# Patient Record
Sex: MALE | Race: WHITE
[De-identification: names, ages, dates, MRNs, and addresses within clinical notes are randomized per-mention and may not be internally consistent; named-entity substitution may affect disease eponyms.]

---

## 2017-02-26 ENCOUNTER — HOSPITAL ENCOUNTER (EMERGENCY)
Dept: HOSPITAL 33 - ED | Age: 67
Discharge: HOME | End: 2017-02-26
Payer: MEDICARE

## 2017-02-26 VITALS — DIASTOLIC BLOOD PRESSURE: 83 MMHG | SYSTOLIC BLOOD PRESSURE: 139 MMHG | HEART RATE: 95 BPM

## 2017-02-26 VITALS — OXYGEN SATURATION: 94 %

## 2017-02-26 DIAGNOSIS — R20.0: ICD-10-CM

## 2017-02-26 DIAGNOSIS — R07.89: ICD-10-CM

## 2017-02-26 DIAGNOSIS — G62.9: Primary | ICD-10-CM

## 2017-02-26 LAB
ALBUMIN SERPL-MCNC: 4 G/DL (ref 3.4–5)
ALP SERPL-CCNC: 87 U/L (ref 46–116)
ALT SERPL-CCNC: 41 U/L (ref 12–78)
ANION GAP SERPL CALC-SCNC: 15.4 MEQ/L (ref 5–15)
AST SERPL QL: 25 U/L (ref 15–37)
BASOPHILS NFR BLD AUTO: 0.3 % (ref 0–0.4)
BILIRUB BLD-MCNC: 0.4 MG/DL (ref 0.2–1)
BUN SERPL-MCNC: 17 MG/DL (ref 9–20)
CHLORIDE SERPL-SCNC: 105 MEQ/L (ref 98–107)
CO2 SERPL-SCNC: 24.3 MEQ/L (ref 21–32)
GLUCOSE SERPL-MCNC: 98 MG/DL (ref 70–110)
MCH RBC QN AUTO: 28.3 PG (ref 26–32)
NEUTROPHILS NFR BLD AUTO: 56.5 % (ref 36–66)
PLATELET # BLD AUTO: 264 K/MM3 (ref 150–450)
POTASSIUM SERPLBLD-SCNC: 4 MEQ/L (ref 3.5–5.1)
PROT SERPL-MCNC: 8.2 GM/DL (ref 6.4–8.2)
RBC # BLD AUTO: 5.78 M/MM3 (ref 4.1–5.6)
SODIUM SERPL-SCNC: 141 MEQ/L (ref 136–145)
TROPONIN T SERPL HS-MCNC: < 0.017 NG/ML (ref 0–0.06)
WBC # BLD AUTO: 7.2 K/MM3 (ref 4–10.5)

## 2017-02-26 PROCEDURE — 99284 EMERGENCY DEPT VISIT MOD MDM: CPT

## 2017-02-26 PROCEDURE — 80053 COMPREHEN METABOLIC PANEL: CPT

## 2017-02-26 PROCEDURE — 72050 X-RAY EXAM NECK SPINE 4/5VWS: CPT

## 2017-02-26 PROCEDURE — 93041 RHYTHM ECG TRACING: CPT

## 2017-02-26 PROCEDURE — 93005 ELECTROCARDIOGRAM TRACING: CPT

## 2017-02-26 PROCEDURE — 84484 ASSAY OF TROPONIN QUANT: CPT

## 2017-02-26 PROCEDURE — 85025 COMPLETE CBC W/AUTO DIFF WBC: CPT

## 2017-02-26 PROCEDURE — 99283 EMERGENCY DEPT VISIT LOW MDM: CPT

## 2017-02-26 PROCEDURE — 71020: CPT

## 2017-02-26 PROCEDURE — 36000 PLACE NEEDLE IN VEIN: CPT

## 2017-02-26 PROCEDURE — 36415 COLL VENOUS BLD VENIPUNCTURE: CPT

## 2017-02-26 NOTE — XRAY
Indication: Left arm tingling.



Comparison: None



5 views of the cervical spine demonstrates normal alignment with C4-C6 limbus

vertebrae.  Disc spaces maintained.  C6-T1 degenerative facet arthropathy.  No

acute fracture, subluxation, or soft tissue abnormalities.  Foramina

bilaterally patent.



Impression: Degenerative changes in a otherwise negative cervical spine.

## 2017-02-26 NOTE — XRAY
Indication: Chest pain and left arm tingling.



Comparison: July 20, 2014.



PA/lateral chest again hyperinflated with right mid-upper lung peripheral

calcified granuloma.  No focal infiltrate, consolidation, or large effusion.

Heart and mediastinal structures stable and within normal limits.  Bony thorax

intact.



Impression: Stable nonacute chest with chronic features.

## 2017-02-26 NOTE — ERPHSYRPT
- History of Present Illness


Time Seen by Provider: 02/26/17 13:47


Source: patient


Exam Limitations: no limitations


Patient Subjective Stated Complaint: pt states he began having numbness to left 

arm on 2/23/17. states he started on lisinopril 10mg on 2/24/17 from 's 

office. pt denies any pain at this time.


Triage Nursing Assessment: pt pink, warm, dry. hand  equal and strong. 

radial pulses strong. pt ambulated into er without difficulty.


Physician History: 





The patient is a 66-year-old male with his wife complaining of left upper arm 

tingling for several weeks.  3 days ago the patient went for brisk walk with 

his wife and was concerned because he was out of breath and has chest 

discomfort during the walk.  The chest discomfort is no longer present that the 

patient talked to his son who convinced him to come to the emergency room for a 

workup.  His left upper arm is an intermittent pain that sometimes is 

positional with his arm.  His past medical history is significant for high 

blood pressure, anxiety, and GERD.  He's had numerous musculoskeletal 

surgeries.  He has no past medical history of heart problems.  He did smoke for 

many years.


Timing/Duration: week(s) (several), intermittent


Severity: mild


Character of Deficits: LLE (tingling)


Deficits: no difficulties


Baseline/Normal Cognition: alert oriented x 3


Current Cognition: alert oriented x 3


Baseline Gait: walks w/o assistance


Associated Symptoms: denies symptoms


Allergies/Adverse Reactions: 








iodine Allergy (Mild, Verified 02/26/17 13:31)


 Rash


 itchiness 





Home Medications: 








Lisinopril 10 mg*** [Zestril 10 MG***] 10 mg PO DAILY 02/26/17 [History]


Pantoprazole Sodium [Protonix] 40 mg PO DAILY 02/26/17 [History]





Hx Tetanus, Diphtheria Vaccination/Date Given: Yes (unknown)


Hx Influenza Vaccination/Date Given: Yes


Hx Pneumococcal Vaccination/Date Given: No


Immunizations Up to Date: Yes





- Review of Systems


Constitutional: No Fever, No Chills


Eyes: No Symptoms


Ears, Nose, & Throat: No Symptoms


Respiratory: No Cough, No Dyspnea


Cardiac: No Chest Pain, No Edema, No Syncope


Abdominal/Gastrointestinal: No Abdominal Pain, No Nausea, No Vomiting, No 

Diarrhea


Genitourinary Symptoms: No Dysuria


Musculoskeletal: No Back Pain, No Neck Pain


Skin: No Rash


Neurological: Other (tingling)


Psychological: No Symptoms


Endocrine: No Symptoms


Hematologic/Lymphatic: No Symptoms


Immunological/Allergic: No Symptoms


All Other Systems: Reviewed and Negative





- Past Medical History


Pertinent Past Medical History: Yes


Neurological History: No Pertinent History


ENT History: Cataracts


Cardiac History: Hypertension


Respiratory History: No Pertinent History


Endocrine Medical History: No Pertinent History


Musculoskeletal History: No Pertinent History


GI Medical History: GERD


 History: Other


Psycho-Social History: Anxiety


Other Medical History: viral encephalitis 1993





- Past Surgical History


Past Surgical History: Yes


Neuro Surgical History: No Pertinent History


Cardiac: No Pertinent History


Respiratory: No Pertinent History


Musculoskeletal: Joint Replacement, Orthopedic Surgery


Other Surgical History: donn knee replacement -cataract removed and lens 

replacement - kidney stones, carpal tunnel donn.





- Social History


Smoking Status: Former smoker


Exposure to second hand smoke: No


Drug Use: none


Patient Lives Alone: No





- Nursing Vital Signs


Nursing Vital Signs: 


 Initial Vital Signs











Temperature                    97.9 F


 


Temperature Source             Oral


 


Pulse Rate                     69


 


Respiratory Rate               18


 


Blood Pressure []              182/92


 


Pain Intensity                 0

















- [place] Coma Scale


Best Eye Response ([place]): (4) open spontaneously


Best Verbal Response ([place]): (5) oriented


Best Motor Response ([place]): (6) obeys commands


[place] Total: 15





- Physical Exam


General Appearance: no apparent distress, alert


Eye Exam: bilateral eye: normal inspection


Ears, Nose, Throat Exam: normal ENT inspection, moist mucous membranes


Neck Exam: normal inspection, non-tender, supple


Respiratory: normal breath sounds, lungs clear, airway intact, No respiratory 

distress


Cardiovascular: regular rate/rhythm, No edema


Gastrointestinal: soft, No tenderness, No distention


Rectal Exam: not done


Back Exam: normal inspection


Extremity Exam: normal inspection, No pedal edema


Mental Status: alert, oriented x 3


CNs Exam: tongue midline


Coordination/Gait: normal finger to nose, normal gait


Motor/Sensory: no motor deficit


Skin Exam: normal color, warm, dry, No rash


**SpO2 Interpretation**: normal


Oxygen Delivery: Room Air





- Course


EKG Interpreted by Me: Sinus Rhythm, NORMAL AXIS, NORMAL INTERVALS, NORMAL QRS, 

NORMAL ST-T





- Radiology Exams


  ** C-Spine


X-ray Interpretation: Interpreted by me, No Fracture, Other (degenerative 

changes, worse at C6/C7. Right foraminal poorly seen.)





  ** Chest


X-ray Interpretation: Interpreted by me, Negative


Ordered Tests: 


 Active Orders 24 hr











 Category Date Time Status


 


 Cardiac Monitor STAT Care  02/26/17 13:33 Active


 


 EKG-ER Only STAT Care  02/26/17 13:32 Active


 


 CERVICAL SPINE MINIMUM 4 VIEWS Stat Exams  02/26/17 14:10 Taken


 


 CHEST 2 VIEWS (PA AND LAT) Stat Exams  02/26/17 14:09 Taken


 


 CBC W DIFF Stat Lab  02/26/17 14:00 Completed


 


 CMP Stat Lab  02/26/17 14:00 Completed


 


 TROPONIN Stat Lab  02/26/17 14:00 Completed











Lab/Rad Data: 


 Laboratory Result Diagrams





 02/26/17 14:00 





 02/26/17 14:00 





 Laboratory Results











  02/26/17 02/26/17 Range/Units





  14:00 14:00 


 


WBC   7.2  (4.0-10.5)  K/mm3


 


RBC   5.78 H  (4.1-5.6)  M/mm3


 


Hgb   16.4  (12.5-18.0)  gm/dl


 


Hct   48.1  (42-50)  %


 


MCV   83.2  ()  fl


 


MCH   28.3  (26-32)  pg


 


MCHC   34.1  (32-36)  g/dl


 


RDW   13.6  (11.5-14.0)  %


 


Plt Count   264  (150-450)  K/mm3


 


MPV   10.4 H  (6-9.5)  fl


 


Gran %   56.5  (36.0-66.0)  %


 


Lymphocytes %   34.9  (24.0-44.0)  %


 


Monocytes %   7.3  (0.0-12.0)  %


 


Eosinophils %   1.0  (0.00-5.0)  %


 


Basophils %   0.3  (0.0-0.4)  %


 


Basophils #   0.02  (0-0.4)  


 


Sodium  141   (136-145)  mEq/L


 


Potassium  4.0   (3.5-5.1)  mEq/L


 


Chloride  105   ()  mEq/L


 


Carbon Dioxide  24.3   (21-32)  mEq/L


 


Anion Gap  15.4 H   (5-15)  MEQ/L


 


BUN  17   (9-20)  mg/dL


 


Creatinine  1.24   (0.55-1.30)  mg/dl


 


Estimated GFR  > 60   ML/MIN


 


Glucose  98   ()  MG/DL


 


Calcium  9.1   (8.5-10.1)  mg/dL


 


Total Bilirubin  0.4   (0.2-1.0)  mg/dL


 


AST  25   (15-37)  U/L


 


ALT  41   (12-78)  U/L


 


Alkaline Phosphatase  87   ()  U/L


 


Troponin I  < 0.017   (0.000-0.056)  ng/ml


 


Serum Total Protein  8.2   (6.4-8.2)  gm/dL


 


Albumin  4.0   (3.4-5.0)  g/dL














- Progress


Progress: unchanged


Counseled pt/family regarding: lab results, diagnosis, need for follow-up, rad 

results





- Departure


Time of Disposition: 14:57


Departure Disposition: Home


Clinical Impression: 


 Neuropathy of left upper extremity





Condition: Stable


Critical Care Time: No


Additional Instructions: 


Tylenol and Ibuprofen as needed. Follow up on Monday.

## 2018-01-02 ENCOUNTER — HOSPITAL ENCOUNTER (EMERGENCY)
Dept: HOSPITAL 33 - ED | Age: 68
Discharge: HOME | End: 2018-01-02
Payer: MEDICARE

## 2018-01-02 VITALS — HEART RATE: 75 BPM | SYSTOLIC BLOOD PRESSURE: 149 MMHG | DIASTOLIC BLOOD PRESSURE: 86 MMHG | OXYGEN SATURATION: 97 %

## 2018-01-02 DIAGNOSIS — M54.6: ICD-10-CM

## 2018-01-02 DIAGNOSIS — S29.011A: ICD-10-CM

## 2018-01-02 DIAGNOSIS — F41.9: ICD-10-CM

## 2018-01-02 DIAGNOSIS — R07.81: Primary | ICD-10-CM

## 2018-01-02 DIAGNOSIS — E11.9: ICD-10-CM

## 2018-01-02 DIAGNOSIS — I10: ICD-10-CM

## 2018-01-02 DIAGNOSIS — K21.9: ICD-10-CM

## 2018-01-02 PROCEDURE — 96372 THER/PROPH/DIAG INJ SC/IM: CPT

## 2018-01-02 PROCEDURE — 71100 X-RAY EXAM RIBS UNI 2 VIEWS: CPT

## 2018-01-02 PROCEDURE — 71045 X-RAY EXAM CHEST 1 VIEW: CPT

## 2018-01-02 PROCEDURE — 99284 EMERGENCY DEPT VISIT MOD MDM: CPT

## 2018-01-02 NOTE — XRAY
Indication: Right lower rib pain following injury.



Comparison: None



2 views of the right ribs obtained.  No bony, articular, or soft tissue

abnormalities.

## 2018-01-02 NOTE — XRAY
Indication: Right lower rib pain following injury.



Comparison: February 26, 2017.



Portable chest again hyperinflated and clear with incidental right upper lung

calcified granuloma.  Heart is not enlarged.  Bony thorax intact.



Impression: Stable nonacute chest with chronic feature.

## 2018-01-02 NOTE — ERPHSYRPT
- History of Present Illness


Time Seen by Provider: 01/02/18 14:52


Source: patient


Exam Limitations: no limitations


Patient Subjective Stated Complaint: was laying on floor board of car doing 

some work and felt something pop around posterior lower ribs.  now having pain 

with movement in ribs.


Triage Nursing Assessment: ambulated to room holding right flank area.  skin w/d

, color normal, resp easy.  patient yelling out and holding right side when he 

moves.  area very tender to touch.  no bruising noted.


Physician History: 





67-year-old white male arrives with complaint of pain in his right posterior 

low ribs since just prior to arrival.


According to the patient he was working on a car on his side he felt something 

pop now he has pain in the right lower posterior ribs worse with moving and 

palpation.


Patient has no shortness of breath.





Past medical history includes cataracts, high blood pressure, diabetes, GERD, 

anxiety, viral encephalitis.





Past surgical history includes joint replacement, orthopedic surgery, bilateral 

knee replacement, cataracts removed, lens replacement, kidney stones, bilateral 

carpal tunnel.





Timing/Duration: today (just prior to arrival)


Severity: moderate


Modifying Factors: Improves With: movement


Associated Symptoms: No nausea, No abdominal pain, No shortness of breath, No 

heartburn, No diaphoresis, No cough, No chills, No chest pain, No loss of 

appetite, No malaise, No rash, No syncope, No seizure, No weakness


Allergies/Adverse Reactions: 








iodine Allergy (Mild, Verified 01/02/18 14:42)


 Rash


 itchiness 





Home Medications: 








Sertraline HCl 50 mg** [Zoloft 50 mg Tablet**] 50 mg PO DAILY 01/02/18 [History]


Tamsulosin HCl 0.4 mg*** [Flomax 0.4 MG***] 0.4 mg PO DAILY 01/02/18 [History]





Hx Tetanus, Diphtheria Vaccination/Date Given: No (unknown)


Hx Influenza Vaccination/Date Given: Yes


Hx Pneumococcal Vaccination/Date Given: Yes





- Review of Systems


Constitutional: No Fever, No Chills


Eyes: No Symptoms


Ears, Nose, & Throat: No Symptoms


Respiratory: No Cough, No Dyspnea


Cardiac: No Chest Pain, No Edema, No Syncope


Abdominal/Gastrointestinal: No Abdominal Pain, No Nausea, No Vomiting, No 

Diarrhea


Genitourinary Symptoms: No Dysuria


Musculoskeletal: Other (pain right posterior lower ribs with movement and 

palpation)


Skin: No Rash


Neurological: No Dizziness, No Focal Weakness, No Sensory Changes


Psychological: No Symptoms


Endocrine: No Symptoms


All Other Systems: Reviewed and Negative





- Past Medical History


Pertinent Past Medical History: Yes


Neurological History: No Pertinent History


ENT History: Cataracts


Cardiac History: Hypertension


Respiratory History: No Pertinent History


Endocrine Medical History: No Pertinent History


Musculoskeletal History: No Pertinent History


GI Medical History: GERD


 History: Other


Psycho-Social History: Anxiety


Other Medical History: viral encephalitis 1993





- Past Surgical History


Past Surgical History: Yes


Neuro Surgical History: No Pertinent History


Cardiac: No Pertinent History


Respiratory: No Pertinent History


Musculoskeletal: Joint Replacement, Orthopedic Surgery


Other Surgical History: donn knee replacement -cataract removed and lens 

replacement - kidney stones, carpal tunnel donn.





- Social History


Smoking Status: Former smoker


Exposure to second hand smoke: No


Drug Use: none


Patient Lives Alone: No





- Nursing Vital Signs


Nursing Vital Signs: 


 Initial Vital Signs











Temperature  97.2 F   01/02/18 14:38


 


Pulse Rate  75   01/02/18 14:38


 


Respiratory Rate  19   01/02/18 14:38


 


Blood Pressure  149/86   01/02/18 14:38


 


O2 Sat by Pulse Oximetry  97   01/02/18 14:38








 Pain Scale











Pain Intensity                 10

















- Physical Exam


General Appearance: moderate distress


Eye Exam: PERRL/EOMI, eyes nml inspection


Ears, Nose, Throat Exam: normal ENT inspection, TMs normal, pharynx normal, 

moist mucous membranes


Neck Exam: normal inspection, non-tender, supple, full range of motion


Respiratory Exam: normal breath sounds, lungs clear, No respiratory distress


Cardiovascular Exam: regular rate/rhythm, normal heart sounds, normal 

peripheral pulses


Gastrointestinal/Abdomen Exam: soft, normal bowel sounds, No tenderness, No mass


Back Exam: other (pain right posterior lower ribs with palpation and movement)


Extremity Exam: normal inspection, normal range of motion, pelvis stable


Neurologic Exam: alert, oriented x 3, cooperative, normal mood/affect, nml 

cerebellar function, nml station & gait, sensation nml, No motor deficits


Skin Exam: normal color, warm, dry, No rash


Lymphatic Exam: No adenopathy


**SpO2 Interpretation**: normal (97%)


SpO2: 97


Oxygen Delivery: Room Air





- Course


Nursing assessment & vital signs reviewed: Yes





- Radiology Exams


  ** Chest


X-ray Interpretation: Discussed w/ radiologist (stable, non-acute chest with 

chronic features)





  ** Right Ribs


X-ray Interpretation: Discussed w/ radiologist (X-ray right ribs no bony, 

articular, or soft tissue abnormalities)


Ordered Tests: 


 Active Orders 24 hr











 Category Date Time Status


 


 CHEST 1 VIEW (PORTABLE) Stat Exams  01/02/18 14:51 Completed


 


 RIBS UNILATERAL Stat Exams  01/02/18 15:10 Completed








Medication Summary














Discontinued Medications














Generic Name Dose Route Start Last Admin





  Trade Name Maddi  PRN Reason Stop Dose Admin


 


Ketorolac Tromethamine  60 mg  01/02/18 14:49  01/02/18 15:00





  Toradol 30 Mg Injection***  IM  01/02/18 14:50  60 mg





  STAT ONE   Administration


 


Ketorolac Tromethamine  Confirm  01/02/18 14:55  





  Toradol 30 Mg Injection***  Administered  01/02/18 14:56  





  Dose   





  60 mg   





  .ROUTE   





  .STK-MED ONE   














- Progress


Progress: improved


Progress Note: 





01/02/18 15:35


67-year-old white male arrives with complaint of pain overlying the right 

posterior lower ribs which began when he was laying on his side working on a 

car he states he thought he felt a pop.


He is point tender over the right lower ribs lateral to the vertebral column 

with palpation and movement.





X-ray of the right ribs no fractures, no bony articular or soft tissue 

abnormalities.





X-ray of the chest stable nonacute chest with chronic feature bony thorax is 

intact.








Patient is better but not pain-free after Toradol 60 mg IM.





I have offered patient CT he does not want this at this point patient appears 

to be stable and is definitely point tender over the lower ribs.


I offered patient Geraldine he states he has this at home.





I offered to write a prescription for nonsteroidals patient states he will get 

these at home.





Will discharge patient.








- Departure


Time of Disposition: 15:37


Departure Disposition: Home


Clinical Impression: 


 Rib pain on right side, Musculoskeletal strain





Back pain


Qualifiers:


 Back pain location: thoracic back pain Chronicity: acute Back pain laterality: 

right Qualified Code(s): M54.6 - Pain in thoracic spine





Condition: Fair


Critical Care Time: No


Additional Instructions: 


Return home.


Cold packs to area 24-48 hours.


Norco as prescribed by your family doctor.


Advil 2-3 tablets orally every 6 hours with food as needed for pain for up to 5 

days.


Follow-up with your family doctor if symptoms are worse, no better in 48 hours, 

or persist longer than one week.


Return for acute distress or for severe symptoms.


Avoid repetitive or strenuous bending twisting lifting pushing or pulling.

## 2023-03-21 ENCOUNTER — HOSPITAL ENCOUNTER (OUTPATIENT)
Dept: HOSPITAL 33 - SDC | Age: 73
Discharge: HOME | End: 2023-03-21
Attending: OPHTHALMOLOGY
Payer: MEDICARE

## 2023-03-21 VITALS — OXYGEN SATURATION: 93 % | DIASTOLIC BLOOD PRESSURE: 79 MMHG | SYSTOLIC BLOOD PRESSURE: 132 MMHG | HEART RATE: 58 BPM

## 2023-03-21 DIAGNOSIS — H25.812: Primary | ICD-10-CM

## 2023-03-21 PROCEDURE — C1780 LENS, INTRAOCULAR (NEW TECH): HCPCS

## 2023-03-21 PROCEDURE — 99100 ANES PT EXTEME AGE<1 YR&>70: CPT

## 2025-04-17 ENCOUNTER — HOSPITAL ENCOUNTER (OUTPATIENT)
Dept: HOSPITAL 33 - ED | Age: 75
Setting detail: OBSERVATION
LOS: 2 days | Discharge: HOME | End: 2025-04-19
Attending: INTERNAL MEDICINE | Admitting: INTERNAL MEDICINE
Payer: MEDICARE

## 2025-04-17 DIAGNOSIS — J60: ICD-10-CM

## 2025-04-17 DIAGNOSIS — E78.5: ICD-10-CM

## 2025-04-17 DIAGNOSIS — R07.81: ICD-10-CM

## 2025-04-17 DIAGNOSIS — J96.01: Primary | ICD-10-CM

## 2025-04-17 DIAGNOSIS — I10: ICD-10-CM

## 2025-04-17 DIAGNOSIS — R07.9: ICD-10-CM

## 2025-04-17 DIAGNOSIS — J18.9: ICD-10-CM

## 2025-04-17 DIAGNOSIS — Z79.899: ICD-10-CM

## 2025-04-17 DIAGNOSIS — J44.1: ICD-10-CM

## 2025-04-17 LAB
ALBUMIN SERPL-MCNC: 4.3 G/DL (ref 3.5–5)
BASOPHILS # BLD AUTO: 0.03 X10^3/UL (ref 0.01–0.08)
BASOPHILS NFR BLD AUTO: 0.4 % (ref 0.2–1.2)
BILIRUB BLD-MCNC: 0.7 MG/DL (ref 0.2–1.3)
CALCIUM SPEC-MCNC: 8.6 MG/DL (ref 8.4–10.2)
CREAT SERPL-MCNC: 0.92 MG/DL (ref 0.66–1.25)
EOSINOPHIL # BLD AUTO: 0.15 X10^3/UL (ref 0.04–0.54)
GFR SERPLBLD BASED ON 1.73 SQ M-ARVRAT: 86.2 ML/MIN
HCT VFR BLD AUTO: 44.1 % (ref 40.1–51)
HGB BLD-MCNC: 14.9 G/DL (ref 13.7–17.5)
IMM GRANULOCYTES # BLD: 0.03 X10^3U/L (ref 0–0.03)
IMM GRANULOCYTES NFR BLD: 0.4 % (ref 0–0.43)
LYMPHOCYTES # SPEC AUTO: 2.12 X10^3/UL (ref 1.32–3.57)
MAGNESIUM SERPL-MCNC: 2.2 MG/DL (ref 1.6–2.3)
MCH RBC QN AUTO: 28.7 PG (ref 25.7–32.2)
MCHC RBC AUTO-ENTMCNC: 33.8 G/DL (ref 32.3–36.5)
MONOCYTES # BLD AUTO: 0.71 X10^3/UL (ref 0.3–0.82)
NT-PROBNP SERPL-MCNC: 224 PG/ML (ref ?–300)
PLATELET # BLD AUTO: 236 X10^3/UL (ref 163–337)
POTASSIUM SERPLBLD-SCNC: 4.5 MMOL/L (ref 3.5–5.1)
TROPONIN T SERPL HS-MCNC: < 0.012 NG/ML (ref 0–0.03)
WBC # BLD AUTO: 7.9 X10^3/UL (ref 4.23–9.07)

## 2025-04-17 PROCEDURE — 84484 ASSAY OF TROPONIN QUANT: CPT

## 2025-04-17 PROCEDURE — 36415 COLL VENOUS BLD VENIPUNCTURE: CPT

## 2025-04-17 PROCEDURE — 94760 N-INVAS EAR/PLS OXIMETRY 1: CPT

## 2025-04-17 PROCEDURE — 85379 FIBRIN DEGRADATION QUANT: CPT

## 2025-04-17 PROCEDURE — 96375 TX/PRO/DX INJ NEW DRUG ADDON: CPT

## 2025-04-17 PROCEDURE — 93005 ELECTROCARDIOGRAM TRACING: CPT

## 2025-04-17 PROCEDURE — 85025 COMPLETE CBC W/AUTO DIFF WBC: CPT

## 2025-04-17 PROCEDURE — 96374 THER/PROPH/DIAG INJ IV PUSH: CPT

## 2025-04-17 PROCEDURE — 83605 ASSAY OF LACTIC ACID: CPT

## 2025-04-17 PROCEDURE — 71260 CT THORAX DX C+: CPT

## 2025-04-17 PROCEDURE — 0241U: CPT

## 2025-04-17 PROCEDURE — 80053 COMPREHEN METABOLIC PANEL: CPT

## 2025-04-17 PROCEDURE — G0378 HOSPITAL OBSERVATION PER HR: HCPCS

## 2025-04-17 PROCEDURE — 93268 ECG RECORD/REVIEW: CPT

## 2025-04-17 PROCEDURE — 84145 PROCALCITONIN (PCT): CPT

## 2025-04-17 PROCEDURE — 83735 ASSAY OF MAGNESIUM: CPT

## 2025-04-17 PROCEDURE — 71045 X-RAY EXAM CHEST 1 VIEW: CPT

## 2025-04-17 PROCEDURE — 83880 ASSAY OF NATRIURETIC PEPTIDE: CPT

## 2025-04-17 PROCEDURE — 94640 AIRWAY INHALATION TREATMENT: CPT

## 2025-04-17 PROCEDURE — 93041 RHYTHM ECG TRACING: CPT

## 2025-04-17 PROCEDURE — 99285 EMERGENCY DEPT VISIT HI MDM: CPT

## 2025-04-17 RX ADMIN — ONDANSETRON ONE MG: 2 INJECTION, SOLUTION INTRAMUSCULAR; INTRAVENOUS at 23:30

## 2025-04-17 RX ADMIN — MORPHINE SULFATE ONE MG: 4 INJECTION, SOLUTION INTRAMUSCULAR; INTRAVENOUS at 23:30

## 2025-04-17 RX ADMIN — IPRATROPIUM BROMIDE AND ALBUTEROL SULFATE ONE ML: .5; 3 SOLUTION RESPIRATORY (INHALATION) at 23:20

## 2025-04-18 LAB
ALBUMIN SERPL-MCNC: 4.1 G/DL (ref 3.5–5)
ANION GAP SERPL CALC-SCNC: 15.5 MEQ/L (ref 5–15)
BASOPHILS # BLD AUTO: 0.03 X10^3/UL (ref 0.01–0.08)
BASOPHILS NFR BLD AUTO: 0.4 % (ref 0.2–1.2)
BILIRUB BLD-MCNC: 0.8 MG/DL (ref 0.2–1.3)
CALCIUM SPEC-MCNC: 8.6 MG/DL (ref 8.4–10.2)
CREAT SERPL-MCNC: 0.87 MG/DL (ref 0.66–1.25)
EOSINOPHIL # BLD AUTO: 0.13 X10^3/UL (ref 0.04–0.54)
FLUAV AG NPH QL IA: NEGATIVE
FLUBV AG NPH QL IA: NEGATIVE
GFR SERPLBLD BASED ON 1.73 SQ M-ARVRAT: 89.4 ML/MIN
HCT VFR BLD AUTO: 43.3 % (ref 40.1–51)
HGB BLD-MCNC: 14.4 G/DL (ref 13.7–17.5)
IMM GRANULOCYTES # BLD: 0.03 X10^3U/L (ref 0–0.03)
IMM GRANULOCYTES NFR BLD: 0.4 % (ref 0–0.43)
LYMPHOCYTES # SPEC AUTO: 1.84 X10^3/UL (ref 1.32–3.57)
MCH RBC QN AUTO: 28.4 PG (ref 25.7–32.2)
MCHC RBC AUTO-ENTMCNC: 33.3 G/DL (ref 32.3–36.5)
MONOCYTES # BLD AUTO: 0.66 X10^3/UL (ref 0.3–0.82)
PLATELET # BLD AUTO: 225 X10^3/UL (ref 163–337)
POTASSIUM SERPLBLD-SCNC: 4.8 MMOL/L (ref 3.5–5.1)
PROT SERPL-MCNC: 6.7 G/DL (ref 6.3–8.2)
RBC # BLD AUTO: 5.07 X10^6/UL (ref 4.63–6.08)
RSV AG SPEC QL IA: NEGATIVE
SARS-COV-2 AG RESP QL IA.RAPID: NEGATIVE
WBC # BLD AUTO: 7.1 X10^3/UL (ref 4.23–9.07)

## 2025-04-18 RX ADMIN — CEFTRIAXONE SODIUM SCH MLS/HR: 1 INJECTION, POWDER, FOR SOLUTION INTRAMUSCULAR; INTRAVENOUS at 17:11

## 2025-04-18 RX ADMIN — WATER SCH MG: 1 INJECTION INTRAMUSCULAR; INTRAVENOUS; SUBCUTANEOUS at 10:32

## 2025-04-18 RX ADMIN — SIMVASTATIN SCH MG: 10 TABLET, FILM COATED ORAL at 22:00

## 2025-04-18 RX ADMIN — ENOXAPARIN SODIUM SCH MG: 100 INJECTION SUBCUTANEOUS at 10:33

## 2025-04-18 RX ADMIN — LORATADINE SCH MG: 10 TABLET ORAL at 10:32

## 2025-04-18 RX ADMIN — SODIUM CHLORIDE SCH MLS/HR: 9 INJECTION, SOLUTION INTRAVENOUS at 18:21

## 2025-04-18 RX ADMIN — PANTOPRAZOLE SODIUM SCH MG: 40 TABLET, DELAYED RELEASE ORAL at 10:33

## 2025-04-18 RX ADMIN — CARVEDILOL SCH MG: 6.25 TABLET, FILM COATED ORAL at 10:32

## 2025-04-18 RX ADMIN — KETOROLAC TROMETHAMINE PRN MG: 30 INJECTION, SOLUTION INTRAMUSCULAR; INTRAVENOUS at 10:33

## 2025-04-18 RX ADMIN — MORPHINE SULFATE PRN MG: 2 INJECTION, SOLUTION INTRAMUSCULAR; INTRAVENOUS at 02:24

## 2025-04-18 RX ADMIN — DIPHENHYDRAMINE HYDROCHLORIDE ONE MG: 50 INJECTION INTRAMUSCULAR; INTRAVENOUS at 12:45

## 2025-04-18 RX ADMIN — IPRATROPIUM BROMIDE AND ALBUTEROL SULFATE SCH ML: .5; 3 SOLUTION RESPIRATORY (INHALATION) at 05:33

## 2025-04-18 RX ADMIN — SERTRALINE SCH MG: 50 TABLET, FILM COATED ORAL at 10:33

## 2025-04-19 VITALS
RESPIRATION RATE: 18 BRPM | SYSTOLIC BLOOD PRESSURE: 115 MMHG | HEART RATE: 70 BPM | TEMPERATURE: 97.4 F | DIASTOLIC BLOOD PRESSURE: 57 MMHG

## 2025-04-19 LAB
ALBUMIN SERPL-MCNC: 4.1 G/DL (ref 3.5–5)
ANION GAP SERPL CALC-SCNC: 16.8 MEQ/L (ref 5–15)
BASOPHILS # BLD AUTO: 0.02 X10^3/UL (ref 0.01–0.08)
BASOPHILS NFR BLD AUTO: 0.1 % (ref 0.2–1.2)
BILIRUB BLD-MCNC: 0.5 MG/DL (ref 0.2–1.3)
CALCIUM SPEC-MCNC: 8.5 MG/DL (ref 8.4–10.2)
CREAT SERPL-MCNC: 1.01 MG/DL (ref 0.66–1.25)
EOSINOPHIL # BLD AUTO: 0 X10^3/UL (ref 0.04–0.54)
GFR SERPLBLD BASED ON 1.73 SQ M-ARVRAT: 77.1 ML/MIN
HCT VFR BLD AUTO: 42.6 % (ref 40.1–51)
HGB BLD-MCNC: 13.8 G/DL (ref 13.7–17.5)
IMM GRANULOCYTES # BLD: 0.05 X10^3U/L (ref 0–0.03)
IMM GRANULOCYTES NFR BLD: 0.4 % (ref 0–0.43)
LYMPHOCYTES # SPEC AUTO: 0.82 X10^3/UL (ref 1.32–3.57)
MCH RBC QN AUTO: 28.2 PG (ref 25.7–32.2)
MCHC RBC AUTO-ENTMCNC: 32.4 G/DL (ref 32.3–36.5)
MONOCYTES # BLD AUTO: 0.22 X10^3/UL (ref 0.3–0.82)
PLATELET # BLD AUTO: 230 X10^3/UL (ref 163–337)
POTASSIUM SERPLBLD-SCNC: 4.4 MMOL/L (ref 3.5–5.1)
PROT SERPL-MCNC: 6.9 G/DL (ref 6.3–8.2)
WBC # BLD AUTO: 14.1 X10^3/UL (ref 4.23–9.07)

## 2025-04-20 VITALS — OXYGEN SATURATION: 94 %
